# Patient Record
Sex: FEMALE | Race: ASIAN | NOT HISPANIC OR LATINO | URBAN - METROPOLITAN AREA
[De-identification: names, ages, dates, MRNs, and addresses within clinical notes are randomized per-mention and may not be internally consistent; named-entity substitution may affect disease eponyms.]

---

## 2019-05-02 ENCOUNTER — OFFICE VISIT - HEALTHEAST (OUTPATIENT)
Dept: FAMILY MEDICINE | Facility: CLINIC | Age: 59
End: 2019-05-02

## 2019-05-02 ENCOUNTER — COMMUNICATION - HEALTHEAST (OUTPATIENT)
Dept: TELEHEALTH | Facility: CLINIC | Age: 59
End: 2019-05-02

## 2019-05-02 ENCOUNTER — HOSPITAL ENCOUNTER (OUTPATIENT)
Dept: LAB | Age: 59
Setting detail: SPECIMEN
Discharge: HOME | End: 2019-05-02

## 2019-05-02 DIAGNOSIS — M79.661 PAIN OF RIGHT LOWER LEG: ICD-10-CM

## 2019-05-02 DIAGNOSIS — M79.621 PAIN OF RIGHT UPPER ARM: ICD-10-CM

## 2019-05-02 DIAGNOSIS — Z00.00 ROUTINE GENERAL MEDICAL EXAMINATION AT A HEALTH CARE FACILITY: ICD-10-CM

## 2019-05-02 DIAGNOSIS — Z13.220 SCREENING FOR HYPERLIPIDEMIA: ICD-10-CM

## 2019-05-02 DIAGNOSIS — Z76.89 ENCOUNTER TO ESTABLISH CARE: ICD-10-CM

## 2019-05-02 LAB
ALBUMIN SERPL-MCNC: 4.1 G/DL (ref 3.5–5)
ALP SERPL-CCNC: 77 U/L (ref 45–120)
ALT SERPL W P-5'-P-CCNC: 19 U/L (ref 0–45)
ANION GAP SERPL CALCULATED.3IONS-SCNC: 13 MMOL/L (ref 5–18)
AST SERPL W P-5'-P-CCNC: 17 U/L (ref 0–40)
BASOPHILS # BLD AUTO: 0.1 THOU/UL (ref 0–0.2)
BASOPHILS NFR BLD AUTO: 1 % (ref 0–2)
BILIRUB SERPL-MCNC: 0.7 MG/DL (ref 0–1)
BUN SERPL-MCNC: 12 MG/DL (ref 8–22)
CALCIUM SERPL-MCNC: 9.6 MG/DL (ref 8.5–10.5)
CHLORIDE BLD-SCNC: 105 MMOL/L (ref 98–107)
CHOLEST SERPL-MCNC: 212 MG/DL
CO2 SERPL-SCNC: 23 MMOL/L (ref 22–31)
CREAT SERPL-MCNC: 0.62 MG/DL (ref 0.6–1.1)
EOSINOPHIL # BLD AUTO: 0.1 THOU/UL (ref 0–0.4)
EOSINOPHIL NFR BLD AUTO: 2 % (ref 0–6)
ERYTHROCYTE [DISTWIDTH] IN BLOOD BY AUTOMATED COUNT: 12.4 % (ref 11–14.5)
FASTING STATUS PATIENT QL REPORTED: YES
GFR SERPL CREATININE-BSD FRML MDRD: >60 ML/MIN/1.73M2
GLUCOSE BLD-MCNC: 118 MG/DL (ref 70–125)
HBA1C MFR BLD: 6.2 % (ref 3.5–6)
HCT VFR BLD AUTO: 45.4 % (ref 35–47)
HDLC SERPL-MCNC: 51 MG/DL
HGB BLD-MCNC: 15 G/DL (ref 12–16)
IRON SATN MFR SERPL: 46 % (ref 20–50)
IRON SERPL-MCNC: 126 UG/DL (ref 42–175)
LDLC SERPL CALC-MCNC: 134 MG/DL
LYMPHOCYTES # BLD AUTO: 3.1 THOU/UL (ref 0.8–4.4)
LYMPHOCYTES NFR BLD AUTO: 45 % (ref 20–40)
MCH RBC QN AUTO: 28.8 PG (ref 27–34)
MCHC RBC AUTO-ENTMCNC: 33.2 G/DL (ref 32–36)
MCV RBC AUTO: 87 FL (ref 80–100)
MONOCYTES # BLD AUTO: 0.4 THOU/UL (ref 0–0.9)
MONOCYTES NFR BLD AUTO: 6 % (ref 2–10)
NEUTROPHILS # BLD AUTO: 3.2 THOU/UL (ref 2–7.7)
NEUTROPHILS NFR BLD AUTO: 47 % (ref 50–70)
PLATELET # BLD AUTO: 192 THOU/UL (ref 140–440)
PMV BLD AUTO: 8.5 FL (ref 7–10)
POTASSIUM BLD-SCNC: 3.8 MMOL/L (ref 3.5–5)
PROT SERPL-MCNC: 7.4 G/DL (ref 6–8)
RBC # BLD AUTO: 5.22 MILL/UL (ref 3.8–5.4)
SODIUM SERPL-SCNC: 141 MMOL/L (ref 136–145)
TIBC SERPL-MCNC: 274 UG/DL (ref 313–563)
TRANSFERRIN SERPL-MCNC: 220 MG/DL (ref 212–360)
TRIGL SERPL-MCNC: 137 MG/DL
TSH SERPL DL<=0.005 MIU/L-ACNC: 1.52 UIU/ML (ref 0.3–5)
VIT B12 SERPL-MCNC: 540 PG/ML (ref 213–816)
WBC: 6.9 THOU/UL (ref 4–11)

## 2019-05-02 ASSESSMENT — MIFFLIN-ST. JEOR: SCORE: 1129.83

## 2019-05-03 LAB — 25(OH)D3 SERPL-MCNC: 17.5 NG/ML (ref 30–80)

## 2019-05-07 ENCOUNTER — COMMUNICATION - HEALTHEAST (OUTPATIENT)
Dept: FAMILY MEDICINE | Facility: CLINIC | Age: 59
End: 2019-05-07

## 2019-05-07 DIAGNOSIS — E55.9 VITAMIN D INSUFFICIENCY: ICD-10-CM

## 2019-07-25 ENCOUNTER — COMMUNICATION - HEALTHEAST (OUTPATIENT)
Dept: FAMILY MEDICINE | Facility: CLINIC | Age: 59
End: 2019-07-25

## 2019-07-25 DIAGNOSIS — E55.9 VITAMIN D INSUFFICIENCY: ICD-10-CM

## 2021-04-12 ENCOUNTER — OFFICE VISIT - HEALTHEAST (OUTPATIENT)
Dept: FAMILY MEDICINE | Facility: CLINIC | Age: 61
End: 2021-04-12

## 2021-04-12 DIAGNOSIS — E88.819 INSULIN RESISTANCE: ICD-10-CM

## 2021-04-12 DIAGNOSIS — M25.512 CHRONIC LEFT SHOULDER PAIN: ICD-10-CM

## 2021-04-12 DIAGNOSIS — G89.29 CHRONIC LEFT SHOULDER PAIN: ICD-10-CM

## 2021-04-12 DIAGNOSIS — M79.641 PAIN IN BOTH HANDS: ICD-10-CM

## 2021-04-12 DIAGNOSIS — M79.642 PAIN IN BOTH HANDS: ICD-10-CM

## 2021-04-12 LAB
C REACTIVE PROTEIN LHE: 0.2 MG/DL (ref 0–0.8)
ERYTHROCYTE [SEDIMENTATION RATE] IN BLOOD BY WESTERGREN METHOD: 18 MM/HR (ref 0–20)
RHEUMATOID FACT SERPL-ACNC: <15 IU/ML (ref 0–30)
URATE SERPL-MCNC: 5.9 MG/DL (ref 2–7.5)

## 2021-04-12 ASSESSMENT — MIFFLIN-ST. JEOR: SCORE: 1123.6

## 2021-04-13 ENCOUNTER — COMMUNICATION - HEALTHEAST (OUTPATIENT)
Dept: FAMILY MEDICINE | Facility: CLINIC | Age: 61
End: 2021-04-13

## 2021-04-13 LAB
ANA SER QL: 0.2 U
CCP AB SER IA-ACNC: <0.5 U/ML
HCV AB SERPL QL IA: NEGATIVE

## 2021-05-28 NOTE — TELEPHONE ENCOUNTER
Left message with patients daughter YATES To return call. Please relay message to patients daughter. We do have consent to communicate on file for her.

## 2021-05-28 NOTE — PROGRESS NOTES
ASSESSMENT & PLAN:  1. Routine general medical examination at a health care facility  Update fasting lab work today follow for results.  Patient declines any further screenings or vaccines.  - Comprehensive Metabolic Panel  - Glycosylated Hemoglobin A1c  - Thyroid Cascade  - Vitamin D, Total (25-Hydroxy)  - HM1(CBC and Differential)  - HM1 (CBC with Diff)    2. Screening for hyperlipidemia  - Lipid Cascade    3. Encounter to establish care      4. Pain of right upper arm  Positive Phalen's maneuver of the right hand, discussed bracing and NSAID use, ice.  If not working and pain continues would consider hand specialist for further evaluation.  - Vitamin B12  - Iron and Transferrin Iron Binding Capacity  - Wrist/Arm DME:    5. Pain of right lower leg  Seems muscular in nature with her description but will await all lab results and see if any alterations.  Discussed physical therapy as a possible option the patient would be willing to pursue.  - Vitamin B12  - Iron and Transferrin Iron Binding Capacity      Patient Instructions   Wrist brace nightly for up to 30 days.     Can use ice or ibuprofen for wrist pain/tingling.    Follow up based on lab results, we will call with results when ready.       Orders Placed This Encounter   Procedures     Wrist/Arm DME:     Order Specific Question:   Which DME provider?     Answer:   [facility]     Order Specific Question:   Which side?     Answer:   Right     Order Specific Question:   What type?     Answer:   non-thumb spica     Order Specific Question:   Length of need:     Answer:   2 months     Order Specific Question:   The face to face evaluation was performed on:     Answer:   5/2/2019     Lipid Cascade     Order Specific Question:   Fasting is required?     Answer:   Yes     Comprehensive Metabolic Panel     Glycosylated Hemoglobin A1c     Thyroid Cascade     Vitamin D, Total (25-Hydroxy)     Vitamin B12     Iron and Transferrin Iron Binding Capacity     HM1 (CBC with  Ultrasound notified of ultrasound order and difficulty finding FHT\"s. Ultrasound will be at bedside shortly. Diff)     There are no discontinued medications.        General  Immunizations reviewed and updated .  Alcohol use, safety and moderation discussed.  Recommended adequate calcium intake/osteoporosis prevention.  Discussed colon cancer screening at age 50, 45 if -American.  Diet and exercise reviewed, including goal of aerobic exercise 30-90 minutes most days of the week, moderation of portions sizes, avoiding eating out and fast food and increase in fruits and vegetables.  Discussed safe sex practices.  Discussed & recommended seat belt (& motorcycle helmet) use.  Discussed & recommended smoke detector.  Discussed sun protection.  Discussed weight management.    The following high BMI interventions were performed this visit: encouragement to exercise and weight monitoring    CHIEF COMPLAINT:  Chief Complaint   Patient presents with     [facility]     est care/ new pt px- pt is fasting     Hand Pain     right hand pain-spreads up arm, into back and down the back of right leg x 6 months       HISTORY OF PRESENT ILLNESS:  Cristiano is a 58 y.o. female presenting to the clinic today for a physical examination.  Patient is a new patient has not been to the doctor anytime recently that she can recall.  Unsure of any family history of her siblings or parents.  No chronic medical conditions per her report.  Has not had any recent health maintenance screenings or labs checked.  We talked about recommended screenings today and she declined mammogram colonoscopy and vaccines today.  She came in because she has some right hand and arm pain that is bothersome for 6 months worsening the last month.  She has pain in the right hand it spreads up to her arm she also describes pain coming down her back into the back of her right leg for about the same amount of time.  No known injury to either area.  Has not had previous pain that was similar.  Her strength in her hand is affected by this pain.  Patient describes it as shooting,  tingling numbness burning.  Pain in the leg is more like an ache and will vary based on her activity level.  Does not do any repetitive motions per her report.  No doubt injury of the shoulder no shoulder pain.  No previous neck injury.  Patient is fasting today to get lab work done.  Discussed that we should update her labs since she is here today.  We will see if this gives us any information added to why she is feeling this way.    REVIEW OF SYSTEMS:   All other systems are negative.    PFSH:  Social History:  The patient reports that there is not domestic violence in her life.     Regular Exercise: no  Sunscreen Use: no  Healthy Diet: yes  Dental Visits Regularly: no  Sexually active: yes  Colonoscopy: no  Prevention of Osteoporosis: no   Last DXA: no    Social History     Socioeconomic History     Marital status: Single     Spouse name: Not on file     Number of children: Not on file     Years of education: Not on file     Highest education level: Not on file   Occupational History     Not on file   Social Needs     Financial resource strain: Not on file     Food insecurity:     Worry: Not on file     Inability: Not on file     Transportation needs:     Medical: Not on file     Non-medical: Not on file   Tobacco Use     Smoking status: Never Smoker     Smokeless tobacco: Never Used   Substance and Sexual Activity     Alcohol use: Not on file     Drug use: Not on file     Sexual activity: Not on file   Lifestyle     Physical activity:     Days per week: Not on file     Minutes per session: Not on file     Stress: Not on file   Relationships     Social connections:     Talks on phone: Not on file     Gets together: Not on file     Attends [employer] service: Not on file     Active member of club or organization: Not on file     Attends meetings of clubs or organizations: Not on file     Relationship status: Not on file     Intimate partner violence:     Fear of current or ex partner: Not on file     Emotionally  "abused: Not on file     Physically abused: Not on file     Forced sexual activity: Not on file   Other Topics Concern     Not on file   Social History Narrative     Not on file       Social History     Tobacco Use   Smoking Status Never Smoker   Smokeless Tobacco Never Used       Family History:  No family history on file.    Past Medical History:  Active Ambulatory Problems     Diagnosis Date Noted     No Active Ambulatory Problems     Resolved Ambulatory Problems     Diagnosis Date Noted     No Resolved Ambulatory Problems     No Additional Past Medical History       No Known Allergies      There is no immunization history on file for this patient.  Immunization status: Refuses Immunization    Gynecologic History:  No LMP recorded. Patient is postmenopausal.  Contraception:Noted  Last Pap: Refuses.   Last mammogram: Refuses    OB History:  OB History    None         Surgical History:  No past surgical history on file.    VITALS:  Vitals:    05/02/19 0950   BP: 133/84   Patient Site: Left Arm   Patient Position: Sitting   Cuff Size: Adult Regular   Pulse: 68   Resp: 16   Temp: 97.1  F (36.2  C)   TempSrc: Oral   Weight: 148 lb 6 oz (67.3 kg)   Height: 4' 9.5\" (1.461 m)     Wt Readings from Last 3 Encounters:   05/02/19 148 lb 6 oz (67.3 kg)       PHYSICAL EXAM:  General Appearance: Reveals an alert, cooperative 58-year-old female.   Vitals:  Per nursing notes.  Head: Normocephalic, without obvious abnormality, atraumatic   Eyes: PERRL, conjunctiva/corneas clear, EOM's intact   Ears: Normal TM's and external ear canals, both ears   Oropharynx: Nares normal, septum midline, mucosa normal, no drainage, lips, and tongue normal   Neck: Supple, symmetrical, trachea midline, no adenopathy; thyroid: not enlarged, symmetric, no tenderness/mass/nodules   Back: Symmetric, no curvature, ROM normal, no CVA tenderness   Lungs: Clear to auscultation bilaterally, respirations unlabored, no wheezing or rales   Heart: Regular rate " and rhythm, S1 and S2 normal, no murmur, rub, or gallop, no carotid bruits  Breasts: Refused  Abdomen: Soft, non-tender, no masses, no organomegaly,  Pelvic: Refused   Extremities: Extremities normal, atraumatic, no cyanosis or edema.  Shoulder exam normal, normal range of motion.  Elbow exam normal.  Wrist with positive Phalen's maneuver, negative Tinel on the right.  Skin: Skin color, texture, turgor normal, no rashes or lesions   Lymph nodes: Cervical, supraclavicular, and axillary nodes normal.  Neurologic: Normal   Psych: Normal affect. Does not appear anxious or depressed.     DATA REVIEWED:  Additional History from Old Records or Another Person Summarized (2 total): None.     Decision to Obtain Extra information (1 total): None.     Radiology Tests Summarized and Ordered (XRAY/CT/MRI/DXA) (1 total): None.    Labs Reviewed and Ordered (1 total): None.    Medicine Tests Summarized and Ordered (EKG/ECHO/COLONOSCOPY/EGD) (1 total): None.    Independent Review of EKG or X-Ray (2 each): None.    The visit lasted a total of 45 minutes face to face with the patient. Over 50% of the time was spent conducting the physical and in coordination of care.      MEDICATIONS:  No current outpatient medications on file.     No current facility-administered medications for this visit.        Total Data Points:     Linsey Barney CNP    This note has been dictated using voice recognition software. Any grammatical or context distortions are unintentional and inherent to the software

## 2021-05-28 NOTE — TELEPHONE ENCOUNTER
----- Message from JEANNE Levy sent at 5/7/2019  9:52 AM CDT -----  Labs show low vitamin D and also would be in range of prediabetes with elevated blood sugar and A1c. Lets have Cristiano replace vitamin d and see if that helps her symptoms at all. She should also watch sugar content in the diet so limit sweets, rice, bread etc. I will send vitamin d replacement to the pharmacy-check which pharmacy she would like to use.

## 2021-05-28 NOTE — TELEPHONE ENCOUNTER
Patient Returning Call  Reason for call:  Return call  Information relayed to patient:  Patient's daughter-in-law, Giovanna (there is a consent on file) was informed of the patient's lab results.  Patient has additional questions:  Yes  If YES, what are your questions/concerns:  Please send the vitamin D replacement Rx to [facility] in [place]. Pharmacy was pended.  Okay to leave a detailed message?: No call back needed

## 2021-05-28 NOTE — PATIENT INSTRUCTIONS - HE
Wrist brace nightly for up to 30 days.     Can use ice or ibuprofen for wrist pain/tingling.    Follow up based on lab results, we will call with results when ready.

## 2021-05-30 NOTE — TELEPHONE ENCOUNTER
RN cannot approve Refill Request    RN can NOT refill this medication med is not covered by policy/route to provider.    Jett Ervin, [facility] Triage/Med Refill 7/25/2019    Requested Prescriptions   Pending Prescriptions Disp Refills     ergocalciferol (ERGOCALCIFEROL) 50,000 unit capsule [Pharmacy Med Name: VITAMIN D2 1.25MG(50,000 UNIT)] 12 capsule 0     Sig: TAKE 1 CAPSULE (50,000 UNITS TOTAL) BY MOUTH ONCE A WEEK FOR 12 DOSES.       There is no refill protocol information for this order

## 2021-05-30 NOTE — TELEPHONE ENCOUNTER
Spoke with pt who then gave phone to Sourav with permission to translate. Pt notified of message below.

## 2021-05-30 NOTE — TELEPHONE ENCOUNTER
Left message to call back for: returning call  Information to relay to patient:  See msg below. Pt's VM isn't set up so I couldn't leave a msg. Per Consent to Communicate left VM on Buckley's phone.

## 2021-06-02 VITALS — BODY MASS INDEX: 31.14 KG/M2 | HEIGHT: 58 IN | WEIGHT: 148.38 LBS

## 2021-06-05 VITALS
SYSTOLIC BLOOD PRESSURE: 135 MMHG | HEIGHT: 58 IN | BODY MASS INDEX: 30.86 KG/M2 | WEIGHT: 147 LBS | HEART RATE: 63 BPM | DIASTOLIC BLOOD PRESSURE: 73 MMHG | RESPIRATION RATE: 16 BRPM

## 2021-06-16 NOTE — PROGRESS NOTES
Assessment/ Plan     1. Pain in both hands  Cristiano presents with complaints of pain in her hands for years.  Her history, exam, and x-ray all confirm osteoarthritis.  I did do some labs to rule out any inflammatory process and x-ray did not show any erosions.  I discussed the nature of osteoarthritis and that there is not a cure, would just manage symptoms.  We will have her try some Voltaren gel topically.  If she would like to consider steroid injections, we could send her to orthopedist.  - XR Hands Bilateral 2 VWS    2. Chronic left shoulder pain  She has some osteoarthritis in her shoulder with decreased range of motion.  I recommend physical therapy to try to increase her range of motion.  - XR Shoulder Left 2 or More VWS      Subjective:       Cristiano Adame is a 60 y.o. female who presents for arm and hand pain.  This patient is new to me today.  She comes in today with her daughter-in-law who acts as the .  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  She states for years now she is had pain in her back, arms, and hands.  She specifically having issues with the joints in her hands the DIP and PIP joints.  She not have any pain over her wrists.  She does not notice any redness, swelling, or warmth.  She is tried medication in the past when she lived in [place] and it was not helpful.  She does not remember the name of it.  When asked her more about the arm pain, this is more from the shoulder.  She has pretty marked decreased range of motion bilaterally and she states been like this for years.  It is hard for her to do anything overhead.  She denies any specific trauma.  She is right-handed, of the left shoulder is worse.  She denies any neck pain.  She has some low back pain but no radiculopathy.  She does not remember if her mother, aunts, grandmothers had similar issues.  She states she is otherwise healthy.  She declines preventative cares today.    Relevant past medical,  "family, surgical, and social history reviewed with patient, unless noted in HPI, not pertinent for this visit.  Medications were discussed and reconciled.   Review of Systems   A 12 point comprehensive review of systems was negative except as noted.      Current Outpatient Medications   Medication Sig Dispense Refill     diclofenac sodium (VOLTAREN) 1 % Gel Apply 2 g topically 2 (two) times a day. 1 Tube 6     No current facility-administered medications for this visit.        Objective:      /73   Pulse 63   Resp 16   Ht 4' 9.5\" (1.461 m)   Wt 147 lb (66.7 kg)   LMP  (LMP Unknown)   BMI 31.26 kg/m        General appearance: alert, appears stated age and cooperative  Bilateral shoulders: Marked decreased range of motion with forward flexion, abduction, and internal rotation.  Strength is 5 out of 5.    Hands bilaterally show no apparent edema or warmth of the joints.  She does have some deformity of 2 of the DIP joints.  No wrist pain.    X-ray left shoulder personally viewed, mild DJD.  Bilateral hand x-ray, personally reviewed: Degenerative arthritis involving multiple DIP joints bilaterally most advanced the right middle DIP joint.  No erosions.  Recent Results (from the past 168 hour(s))   C-Reactive Protein   Result Value Ref Range    CRP 0.2 0.0 - 0.8 mg/dL   Erythrocyte Sedimentation Rate   Result Value Ref Range    Sed Rate 18 0 - 20 mm/hr   Uric Acid   Result Value Ref Range    Uric Acid 5.9 2.0 - 7.5 mg/dL   Rheumatoid Factor Quant   Result Value Ref Range    Rheumatoid Factor Quantitative <15.0 0 - 30 IU/mL            This note has been dictated using voice recognition software. Any grammatical or context distortions are unintentional and inherent to the software  "

## 2021-06-19 NOTE — LETTER
Letter by Linsey Barney FNP at      Author: Linsey Barney FNP Service: -- Author Type: --    Filed:  Encounter Date: 5/7/2019 Status: (Other)         Cristiano Adame  [address] [place] [address]             May 7, 2019         Dear MsAshlee Adame,    Below are the results from your recent visit:    Resulted Orders   Lipid Cascade   Result Value Ref Range    Cholesterol 212 (H) <=199 mg/dL    Triglycerides 137 <=149 mg/dL    HDL Cholesterol 51 >=50 mg/dL    LDL Calculated 134 (H) <=129 mg/dL    Patient Fasting > 8hrs? Yes    Comprehensive Metabolic Panel   Result Value Ref Range    Sodium 141 136 - 145 mmol/L    Potassium 3.8 3.5 - 5.0 mmol/L    Chloride 105 98 - 107 mmol/L    CO2 23 22 - 31 mmol/L    Anion Gap, Calculation 13 5 - 18 mmol/L    Glucose 118 70 - 125 mg/dL    BUN 12 8 - 22 mg/dL    Creatinine 0.62 0.60 - 1.10 mg/dL    GFR MDRD Af Amer >60 >60 mL/min/1.73m2    GFR MDRD Non Af Amer >60 >60 mL/min/1.73m2    Bilirubin, Total 0.7 0.0 - 1.0 mg/dL    Calcium 9.6 8.5 - 10.5 mg/dL    Protein, Total 7.4 6.0 - 8.0 g/dL    Albumin 4.1 3.5 - 5.0 g/dL    Alkaline Phosphatase 77 45 - 120 U/L    AST 17 0 - 40 U/L    ALT 19 0 - 45 U/L    Narrative    Fasting Glucose reference range is 70-99 mg/dL per  American Diabetes Association (ADA) guidelines.   Glycosylated Hemoglobin A1c   Result Value Ref Range    Hemoglobin A1c 6.2 (H) 3.5 - 6.0 %   Thyroid Cascade   Result Value Ref Range    TSH 1.52 0.30 - 5.00 uIU/mL   Vitamin D, Total (25-Hydroxy)   Result Value Ref Range    Vitamin D, Total (25-Hydroxy) 17.5 (L) 30.0 - 80.0 ng/mL    Narrative    Deficiency <10.0 ng/mL  Insufficiency 10.0-29.9 ng/mL  Sufficiency 30.0-80.0 ng/mL  Toxicity (possible) >100.0 ng/mL   Vitamin B12   Result Value Ref Range    Vitamin B-12 540 213 - 816 pg/mL   Iron and Transferrin Iron Binding Capacity   Result Value Ref Range    Iron 126 42 - 175 ug/dL    Transferrin 220 212 - 360 mg/dL    Transferrin Saturation, Calculated 46 20 - 50 %    Transferrin  IBC, Calculated 274 (L) 313 - 563 ug/dL   HM1 (CBC with Diff)   Result Value Ref Range    WBC 6.9 4.0 - 11.0 thou/uL    RBC 5.22 3.80 - 5.40 mill/uL    Hemoglobin 15.0 12.0 - 16.0 g/dL    Hematocrit 45.4 35.0 - 47.0 %    MCV 87 80 - 100 fL    MCH 28.8 27.0 - 34.0 pg    MCHC 33.2 32.0 - 36.0 g/dL    RDW 12.4 11.0 - 14.5 %    Platelets 192 140 - 440 thou/uL    MPV 8.5 7.0 - 10.0 fL    Neutrophils % 47 (L) 50 - 70 %    Lymphocytes % 45 (H) 20 - 40 %    Monocytes % 6 2 - 10 %    Eosinophils % 2 0 - 6 %    Basophils % 1 0 - 2 %    Neutrophils Absolute 3.2 2.0 - 7.7 thou/uL    Lymphocytes Absolute 3.1 0.8 - 4.4 thou/uL    Monocytes Absolute 0.4 0.0 - 0.9 thou/uL    Eosinophils Absolute 0.1 0.0 - 0.4 thou/uL    Basophils Absolute 0.1 0.0 - 0.2 thou/uL        Labs show low vitamin D and also would be in range of prediabetes with elevated blood sugar and A1c.  Lets have Cristiano replace vitamin d and see if that helps her symptoms at all. She should also watch sugar  content in the diet so limit sweets, rice, bread etc. I will send vitamin d replacement to the  pharmacy-check which pharmacy she would like to use.     Please call with questions or contact us using [employer].    Sincerely,        Electronically signed by JEANNE Levy

## 2021-06-21 NOTE — LETTER
Letter by Linsey Richard MD at      Author: Linsey Richard MD Service: -- Author Type: --    Filed:  Encounter Date: 4/13/2021 Status: (Other)         Cristiano Adame  [address] [place] [address]             April 13, 2021         Dear Ms. Adame,    Below are the results from your recent visit:    Resulted Orders   Antinuclear Antibody (ELSA) Cascade   Result Value Ref Range    ELSA Screen Cascade 0.2 <=2.9 U    Narrative    <1.0 negative  1.1-2.9 weakly positive  3.0-5.9 positive ( reflex)  > or=6.0 strongly positive    [facility] Anti-Nuclear Antibody (ELSA) screening tests are performed with a qualitative enzyme immunoassay (EIA) method.   C-Reactive Protein   Result Value Ref Range    CRP 0.2 0.0 - 0.8 mg/dL   Erythrocyte Sedimentation Rate   Result Value Ref Range    Sed Rate 18 0 - 20 mm/hr   CCP Antibodies   Result Value Ref Range    CCP IgG Antibodies <0.5 <=4.9 U/mL   Uric Acid   Result Value Ref Range    Uric Acid 5.9 2.0 - 7.5 mg/dL   Rheumatoid Factor Quant   Result Value Ref Range    Rheumatoid Factor Quantitative <15.0 0 - 30 IU/mL   Hepatitis C Antibody (Anti-HCV)   Result Value Ref Range    Hepatitis C Ab Negative Negative       All of your labs are normal.  Your xrays are consistent with arthritis.  This is the typeo f arthritis where the joint wear out from overuse.  There is no cure for this, we just try to manage symptoms.    Please call with questions or contact us using [employer].    Sincerely,        Electronically signed by Linsey Richard MD

## 2021-07-03 NOTE — ADDENDUM NOTE
Addendum Note by Linsey Yu FNP at 5/9/2019  9:58 AM     Author: Linsey Yu FNP Service: -- Author Type: Nurse Practitioner    Filed: 5/9/2019  9:58 AM Encounter Date: 5/7/2019 Status: Signed    : Linsey Yu FNP (Nurse Practitioner)    Addended by: LINSEY YU on: 5/9/2019 09:58 AM        Modules accepted: Orders